# Patient Record
Sex: MALE | Race: WHITE | Employment: STUDENT | URBAN - METROPOLITAN AREA
[De-identification: names, ages, dates, MRNs, and addresses within clinical notes are randomized per-mention and may not be internally consistent; named-entity substitution may affect disease eponyms.]

---
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
## 2020-07-21 ENCOUNTER — APPOINTMENT (OUTPATIENT)
Dept: LAB | Facility: HOSPITAL | Age: 20
End: 2020-07-21
Attending: INTERNAL MEDICINE
Payer: COMMERCIAL

## 2020-07-21 ENCOUNTER — OFFICE VISIT (OUTPATIENT)
Dept: INTERNAL MEDICINE CLINIC | Facility: CLINIC | Age: 20
End: 2020-07-21
Payer: COMMERCIAL

## 2020-07-21 VITALS
SYSTOLIC BLOOD PRESSURE: 100 MMHG | WEIGHT: 129.69 LBS | RESPIRATION RATE: 16 BRPM | HEART RATE: 75 BPM | BODY MASS INDEX: 18.16 KG/M2 | HEIGHT: 71 IN | DIASTOLIC BLOOD PRESSURE: 71 MMHG

## 2020-07-21 DIAGNOSIS — Z00.00 ADULT GENERAL MEDICAL EXAM: Primary | ICD-10-CM

## 2020-07-21 DIAGNOSIS — Z00.00 ADULT GENERAL MEDICAL EXAM: ICD-10-CM

## 2020-07-21 LAB
ALBUMIN SERPL-MCNC: 4.4 G/DL (ref 3.4–5)
ALBUMIN/GLOB SERPL: 1.3 {RATIO} (ref 1–2)
ALP LIVER SERPL-CCNC: 87 U/L (ref 45–117)
ALT SERPL-CCNC: 18 U/L (ref 16–61)
ANION GAP SERPL CALC-SCNC: 3 MMOL/L (ref 0–18)
AST SERPL-CCNC: 11 U/L (ref 15–37)
BILIRUB SERPL-MCNC: 0.4 MG/DL (ref 0.1–2)
BUN BLD-MCNC: 14 MG/DL (ref 7–18)
BUN/CREAT SERPL: 15.4 (ref 10–20)
CALCIUM BLD-MCNC: 9.1 MG/DL (ref 8.5–10.1)
CHLORIDE SERPL-SCNC: 106 MMOL/L (ref 98–112)
CHOLEST SMN-MCNC: 110 MG/DL (ref ?–200)
CO2 SERPL-SCNC: 31 MMOL/L (ref 21–32)
CREAT BLD-MCNC: 0.91 MG/DL (ref 0.7–1.3)
DEPRECATED RDW RBC AUTO: 39.2 FL (ref 35.1–46.3)
ERYTHROCYTE [DISTWIDTH] IN BLOOD BY AUTOMATED COUNT: 12.6 % (ref 11–15)
GLOBULIN PLAS-MCNC: 3.5 G/DL (ref 2.8–4.4)
GLUCOSE BLD-MCNC: 76 MG/DL (ref 70–99)
HCT VFR BLD AUTO: 43.9 % (ref 39–53)
HDLC SERPL-MCNC: 50 MG/DL (ref 40–59)
HGB BLD-MCNC: 15.3 G/DL (ref 13–17.5)
LDLC SERPL CALC-MCNC: 46 MG/DL (ref ?–100)
M PROTEIN MFR SERPL ELPH: 7.9 G/DL (ref 6.4–8.2)
MCH RBC QN AUTO: 29.9 PG (ref 26–34)
MCHC RBC AUTO-ENTMCNC: 34.9 G/DL (ref 31–37)
MCV RBC AUTO: 85.7 FL (ref 80–100)
NONHDLC SERPL-MCNC: 60 MG/DL (ref ?–130)
OSMOLALITY SERPL CALC.SUM OF ELEC: 289 MOSM/KG (ref 275–295)
PATIENT FASTING Y/N/NP: YES
PATIENT FASTING Y/N/NP: YES
PLATELET # BLD AUTO: 230 10(3)UL (ref 150–450)
POTASSIUM SERPL-SCNC: 3.7 MMOL/L (ref 3.5–5.1)
RBC # BLD AUTO: 5.12 X10(6)UL (ref 4.3–5.7)
SODIUM SERPL-SCNC: 140 MMOL/L (ref 136–145)
TRIGL SERPL-MCNC: 71 MG/DL (ref 30–149)
TSI SER-ACNC: 3.15 MIU/ML (ref 0.36–3.74)
VLDLC SERPL CALC-MCNC: 14 MG/DL (ref 0–30)
WBC # BLD AUTO: 7.6 X10(3) UL (ref 4–11)

## 2020-07-21 PROCEDURE — 80061 LIPID PANEL: CPT

## 2020-07-21 PROCEDURE — 85027 COMPLETE CBC AUTOMATED: CPT

## 2020-07-21 PROCEDURE — 3008F BODY MASS INDEX DOCD: CPT | Performed by: INTERNAL MEDICINE

## 2020-07-21 PROCEDURE — 84443 ASSAY THYROID STIM HORMONE: CPT

## 2020-07-21 PROCEDURE — 80053 COMPREHEN METABOLIC PANEL: CPT

## 2020-07-21 PROCEDURE — 3078F DIAST BP <80 MM HG: CPT | Performed by: INTERNAL MEDICINE

## 2020-07-21 PROCEDURE — 99395 PREV VISIT EST AGE 18-39: CPT | Performed by: INTERNAL MEDICINE

## 2020-07-21 PROCEDURE — 3074F SYST BP LT 130 MM HG: CPT | Performed by: INTERNAL MEDICINE

## 2020-07-21 PROCEDURE — 36415 COLL VENOUS BLD VENIPUNCTURE: CPT

## 2020-07-21 NOTE — PROGRESS NOTES
Jeffry Chaudhary is a 23year old male. Patient presents with:  Physical    HPI:   70-year-old gentleman with no significant medical history here for physical.  He has no complaints.       Past medical history none  Past surgical history none    He is not [place] Psychiatric/Behavioral: Negative for hallucinations, behavioral problems, confusion and agitation.      Wt Readings from Last 5 Encounters:  07/21/20 : 129 lb 11.2 oz (58.8 kg) (12 %, Z= -1.20)*  02/25/19 : 134 lb (60.8 kg) (24 %, Z= -0.71)*  02/14/19 : 1 DIFFERENTIAL; Future  - COMP METABOLIC PANEL (14); Future  - TSH W REFLEX TO FREE T4; Future  - LIPID PANEL; Future    Lump  neck has been stable. I have reviewed the previous ultrasound report.   No need for further work-up unless it is getting bigger

## 2021-06-07 ENCOUNTER — OFFICE VISIT (OUTPATIENT)
Dept: INTERNAL MEDICINE CLINIC | Facility: CLINIC | Age: 21
End: 2021-06-07
Payer: COMMERCIAL

## 2021-06-07 VITALS
WEIGHT: 145.13 LBS | DIASTOLIC BLOOD PRESSURE: 73 MMHG | BODY MASS INDEX: 20.32 KG/M2 | SYSTOLIC BLOOD PRESSURE: 110 MMHG | HEIGHT: 71 IN | HEART RATE: 69 BPM

## 2021-06-07 DIAGNOSIS — H00.012 HORDEOLUM EXTERNUM OF RIGHT LOWER EYELID: Primary | ICD-10-CM

## 2021-06-07 PROCEDURE — 3008F BODY MASS INDEX DOCD: CPT | Performed by: INTERNAL MEDICINE

## 2021-06-07 PROCEDURE — 3078F DIAST BP <80 MM HG: CPT | Performed by: INTERNAL MEDICINE

## 2021-06-07 PROCEDURE — 99213 OFFICE O/P EST LOW 20 MIN: CPT | Performed by: INTERNAL MEDICINE

## 2021-06-07 PROCEDURE — 3074F SYST BP LT 130 MM HG: CPT | Performed by: INTERNAL MEDICINE

## 2021-06-07 RX ORDER — TOBRAMYCIN 3 MG/ML
2 SOLUTION/ DROPS OPHTHALMIC EVERY 6 HOURS
Qty: 1 EACH | Refills: 1 | Status: SHIPPED | OUTPATIENT
Start: 2021-06-07

## 2021-06-07 NOTE — PROGRESS NOTES
Aguilar Sims is a 21year old male. Patient presents with:  Eye Problem: stye on right eye    HPI:   For approximately 1 month he has had a persistent stye on the lateral aspect of his right lower eyelid.   He has been applying warm compresses once daily

## 2021-06-07 NOTE — PATIENT INSTRUCTIONS
Please apply warm compress to your right lower eyelid 3 times daily. Use tobramycin eyedrops 2 drops right eye 4 times daily. Call if no better.

## 2021-07-15 ENCOUNTER — OFFICE VISIT (OUTPATIENT)
Dept: INTERNAL MEDICINE CLINIC | Facility: CLINIC | Age: 21
End: 2021-07-15
Payer: COMMERCIAL

## 2021-07-15 VITALS
HEART RATE: 106 BPM | BODY MASS INDEX: 20.02 KG/M2 | SYSTOLIC BLOOD PRESSURE: 106 MMHG | HEIGHT: 71 IN | RESPIRATION RATE: 14 BRPM | WEIGHT: 143 LBS | DIASTOLIC BLOOD PRESSURE: 70 MMHG | OXYGEN SATURATION: 97 %

## 2021-07-15 DIAGNOSIS — D23.39 DERMOID CYST OF FOREHEAD: Primary | ICD-10-CM

## 2021-07-15 PROCEDURE — 3078F DIAST BP <80 MM HG: CPT | Performed by: INTERNAL MEDICINE

## 2021-07-15 PROCEDURE — 3074F SYST BP LT 130 MM HG: CPT | Performed by: INTERNAL MEDICINE

## 2021-07-15 PROCEDURE — 3008F BODY MASS INDEX DOCD: CPT | Performed by: INTERNAL MEDICINE

## 2021-07-15 PROCEDURE — 99213 OFFICE O/P EST LOW 20 MIN: CPT | Performed by: INTERNAL MEDICINE

## 2021-07-15 NOTE — PROGRESS NOTES
Ade Perez is a 21year old male. Patient presents with:  Lump: Right side Forehead lump    HPI:   26-year-old gentleman with no significant medical history here for evaluation of cyst in the forehead.   He reports that he noticed it couple of months adarsh Pulse 106   Resp 14   Ht 5' 11\" (1.803 m)   Wt 143 lb (64.9 kg)   SpO2 97%   BMI 19.94 kg/m²     Physical Exam  Constitutional:       Appearance: Normal appearance. HENT:      Head: Normocephalic.    Eyes:      Conjunctiva/sclera: Conjunctivae normal.

## 2021-07-19 ENCOUNTER — OFFICE VISIT (OUTPATIENT)
Dept: SURGERY | Facility: CLINIC | Age: 21
End: 2021-07-19
Payer: COMMERCIAL

## 2021-07-19 VITALS
HEIGHT: 71 IN | BODY MASS INDEX: 20.02 KG/M2 | DIASTOLIC BLOOD PRESSURE: 40 MMHG | WEIGHT: 143 LBS | HEART RATE: 76 BPM | SYSTOLIC BLOOD PRESSURE: 88 MMHG

## 2021-07-19 DIAGNOSIS — L08.9 INFECTED SEBACEOUS CYST: Primary | ICD-10-CM

## 2021-07-19 DIAGNOSIS — L72.3 INFECTED SEBACEOUS CYST: Primary | ICD-10-CM

## 2021-07-19 PROCEDURE — 3078F DIAST BP <80 MM HG: CPT | Performed by: SURGERY

## 2021-07-19 PROCEDURE — 3008F BODY MASS INDEX DOCD: CPT | Performed by: SURGERY

## 2021-07-19 PROCEDURE — 3074F SYST BP LT 130 MM HG: CPT | Performed by: SURGERY

## 2021-07-19 PROCEDURE — 99203 OFFICE O/P NEW LOW 30 MIN: CPT | Performed by: SURGERY

## 2021-07-19 PROCEDURE — 11442 EXC FACE-MM B9+MARG 1.1-2 CM: CPT | Performed by: SURGERY

## 2021-07-19 RX ORDER — CEPHALEXIN 500 MG/1
500 CAPSULE ORAL 2 TIMES DAILY
Qty: 14 CAPSULE | Refills: 0 | Status: SHIPPED | OUTPATIENT
Start: 2021-07-19 | End: 2021-07-26

## 2021-07-19 NOTE — H&P
History and Physical      HPI       HPI  Emma Haley is a 21year old male who presents with an inflamed right sided forehead inclusion cyst.  Is been present for some time and he desires excision. Risk benefits options explained.   Does have some scatt gallups, or rubs  Abdomen: soft non-tender non-distended no organomegaly noted no masses  Extremities: no edema, cyanosis, or clubbing  Neurological: exam appropriate for age reflexes and motor skills appropriate for age      ASSESSMENT/PLAN   Assessment

## 2021-07-19 NOTE — PATIENT INSTRUCTIONS
Ice pack if needed. Start Keflex 500 mg twice daily. Keep suture covered with a Band-Aid. Okay to shower in 24 hours. Tylenol or ibuprofen for pain.   Follow-up with me 1 week

## 2021-07-19 NOTE — PROCEDURES
Surgery procedure note    Timeout performed and consent signed. Right forehead prepped and draped in the usual sterile fashion. Small incision is made directly over the cyst and a 2 cm inclusion cyst is removed, and 6 a scant amount of pus.   Hemostasis m

## 2021-07-26 ENCOUNTER — OFFICE VISIT (OUTPATIENT)
Dept: SURGERY | Facility: CLINIC | Age: 21
End: 2021-07-26
Payer: COMMERCIAL

## 2021-07-26 VITALS — WEIGHT: 143 LBS | HEIGHT: 71 IN | BODY MASS INDEX: 20.02 KG/M2

## 2021-07-26 DIAGNOSIS — L08.9 INFECTED SEBACEOUS CYST: Primary | ICD-10-CM

## 2021-07-26 DIAGNOSIS — L72.3 INFECTED SEBACEOUS CYST: Primary | ICD-10-CM

## 2021-07-26 PROCEDURE — 99024 POSTOP FOLLOW-UP VISIT: CPT | Performed by: SURGERY

## 2021-07-26 NOTE — PROGRESS NOTES
Postoperative Patient Follow-up      7/26/2021    HENRIETTA Mora Zheng is a 21year old male postop after excision of forehead cyst.  Doing well. Exam  Incision is clean dry intact. Sutures removed.       Assessment/Plan  Assessment   Infected seba

## 2021-08-09 ENCOUNTER — OFFICE VISIT (OUTPATIENT)
Dept: SURGERY | Facility: CLINIC | Age: 21
End: 2021-08-09
Payer: COMMERCIAL

## 2021-08-09 VITALS — WEIGHT: 143 LBS | BODY MASS INDEX: 20.02 KG/M2 | HEIGHT: 71 IN

## 2021-08-09 DIAGNOSIS — L72.3 INFECTED SEBACEOUS CYST: Primary | ICD-10-CM

## 2021-08-09 DIAGNOSIS — L08.9 INFECTED SEBACEOUS CYST: Primary | ICD-10-CM

## 2021-08-09 PROCEDURE — 3008F BODY MASS INDEX DOCD: CPT | Performed by: SURGERY

## 2021-08-09 PROCEDURE — 99213 OFFICE O/P EST LOW 20 MIN: CPT | Performed by: SURGERY

## 2021-08-09 NOTE — PROGRESS NOTES
Surgery progress note    Patient here for evaluation of forehead cyst.  This was lanced drain in place time antibiotics 2 weeks ago.     Noticed some induration in that area and is unsure whether it scar tissue or recurrence of the cyst.    Exam-there is 8

## 2022-12-15 ENCOUNTER — OFFICE VISIT (OUTPATIENT)
Dept: INTERNAL MEDICINE CLINIC | Facility: CLINIC | Age: 22
End: 2022-12-15
Payer: COMMERCIAL

## 2022-12-15 VITALS
HEART RATE: 85 BPM | SYSTOLIC BLOOD PRESSURE: 115 MMHG | HEIGHT: 72 IN | WEIGHT: 134 LBS | RESPIRATION RATE: 16 BRPM | DIASTOLIC BLOOD PRESSURE: 80 MMHG | BODY MASS INDEX: 18.15 KG/M2

## 2022-12-15 DIAGNOSIS — Z23 FLU VACCINE NEED: ICD-10-CM

## 2022-12-15 DIAGNOSIS — Z00.00 ANNUAL PHYSICAL EXAM: Primary | ICD-10-CM

## 2022-12-15 PROCEDURE — 99395 PREV VISIT EST AGE 18-39: CPT | Performed by: NURSE PRACTITIONER

## 2022-12-15 PROCEDURE — 3074F SYST BP LT 130 MM HG: CPT | Performed by: NURSE PRACTITIONER

## 2022-12-15 PROCEDURE — 3079F DIAST BP 80-89 MM HG: CPT | Performed by: NURSE PRACTITIONER

## 2022-12-15 PROCEDURE — 3008F BODY MASS INDEX DOCD: CPT | Performed by: NURSE PRACTITIONER

## 2022-12-15 PROCEDURE — 90471 IMMUNIZATION ADMIN: CPT | Performed by: NURSE PRACTITIONER

## 2022-12-15 PROCEDURE — 90686 IIV4 VACC NO PRSV 0.5 ML IM: CPT | Performed by: NURSE PRACTITIONER

## 2022-12-28 ENCOUNTER — LAB ENCOUNTER (OUTPATIENT)
Dept: LAB | Age: 22
End: 2022-12-28
Attending: NURSE PRACTITIONER
Payer: COMMERCIAL

## 2022-12-28 DIAGNOSIS — Z00.00 ANNUAL PHYSICAL EXAM: ICD-10-CM

## 2022-12-28 LAB
ALBUMIN SERPL-MCNC: 4 G/DL (ref 3.4–5)
ALBUMIN/GLOB SERPL: 1.1 {RATIO} (ref 1–2)
ALP LIVER SERPL-CCNC: 98 U/L
ALT SERPL-CCNC: 21 U/L
ANION GAP SERPL CALC-SCNC: 6 MMOL/L (ref 0–18)
AST SERPL-CCNC: 18 U/L (ref 15–37)
BASOPHILS # BLD AUTO: 0.06 X10(3) UL (ref 0–0.2)
BASOPHILS NFR BLD AUTO: 0.9 %
BILIRUB SERPL-MCNC: 0.5 MG/DL (ref 0.1–2)
BUN BLD-MCNC: 16 MG/DL (ref 7–18)
BUN/CREAT SERPL: 17.2 (ref 10–20)
CALCIUM BLD-MCNC: 9 MG/DL (ref 8.5–10.1)
CHLORIDE SERPL-SCNC: 104 MMOL/L (ref 98–112)
CHOLEST SERPL-MCNC: 107 MG/DL (ref ?–200)
CO2 SERPL-SCNC: 31 MMOL/L (ref 21–32)
CREAT BLD-MCNC: 0.93 MG/DL
DEPRECATED RDW RBC AUTO: 40.8 FL (ref 35.1–46.3)
EOSINOPHIL # BLD AUTO: 0.39 X10(3) UL (ref 0–0.7)
EOSINOPHIL NFR BLD AUTO: 5.7 %
ERYTHROCYTE [DISTWIDTH] IN BLOOD BY AUTOMATED COUNT: 12.7 % (ref 11–15)
FASTING PATIENT LIPID ANSWER: YES
FASTING STATUS PATIENT QL REPORTED: YES
GFR SERPLBLD BASED ON 1.73 SQ M-ARVRAT: 119 ML/MIN/1.73M2 (ref 60–?)
GLOBULIN PLAS-MCNC: 3.8 G/DL (ref 2.8–4.4)
GLUCOSE BLD-MCNC: 93 MG/DL (ref 70–99)
HCT VFR BLD AUTO: 46.1 %
HDLC SERPL-MCNC: 55 MG/DL (ref 40–59)
HGB BLD-MCNC: 15.5 G/DL
IMM GRANULOCYTES # BLD AUTO: 0.01 X10(3) UL (ref 0–1)
IMM GRANULOCYTES NFR BLD: 0.1 %
LDLC SERPL CALC-MCNC: 36 MG/DL (ref ?–100)
LYMPHOCYTES # BLD AUTO: 3.64 X10(3) UL (ref 1–4)
LYMPHOCYTES NFR BLD AUTO: 53.5 %
MCH RBC QN AUTO: 29.4 PG (ref 26–34)
MCHC RBC AUTO-ENTMCNC: 33.6 G/DL (ref 31–37)
MCV RBC AUTO: 87.5 FL
MONOCYTES # BLD AUTO: 0.58 X10(3) UL (ref 0.1–1)
MONOCYTES NFR BLD AUTO: 8.5 %
NEUTROPHILS # BLD AUTO: 2.12 X10 (3) UL (ref 1.5–7.7)
NEUTROPHILS # BLD AUTO: 2.12 X10(3) UL (ref 1.5–7.7)
NEUTROPHILS NFR BLD AUTO: 31.3 %
NONHDLC SERPL-MCNC: 52 MG/DL (ref ?–130)
OSMOLALITY SERPL CALC.SUM OF ELEC: 293 MOSM/KG (ref 275–295)
PLATELET # BLD AUTO: 249 10(3)UL (ref 150–450)
POTASSIUM SERPL-SCNC: 3.9 MMOL/L (ref 3.5–5.1)
PROT SERPL-MCNC: 7.8 G/DL (ref 6.4–8.2)
RBC # BLD AUTO: 5.27 X10(6)UL
SODIUM SERPL-SCNC: 141 MMOL/L (ref 136–145)
T4 FREE SERPL-MCNC: 0.9 NG/DL (ref 0.8–1.7)
TRIGL SERPL-MCNC: 80 MG/DL (ref 30–149)
TSI SER-ACNC: 4.2 MIU/ML (ref 0.36–3.74)
VLDLC SERPL CALC-MCNC: 11 MG/DL (ref 0–30)
WBC # BLD AUTO: 6.8 X10(3) UL (ref 4–11)

## 2022-12-28 PROCEDURE — 36415 COLL VENOUS BLD VENIPUNCTURE: CPT

## 2022-12-28 PROCEDURE — 85025 COMPLETE CBC W/AUTO DIFF WBC: CPT

## 2022-12-28 PROCEDURE — 80053 COMPREHEN METABOLIC PANEL: CPT

## 2022-12-28 PROCEDURE — 84439 ASSAY OF FREE THYROXINE: CPT

## 2022-12-28 PROCEDURE — 80061 LIPID PANEL: CPT

## 2022-12-28 PROCEDURE — 84443 ASSAY THYROID STIM HORMONE: CPT

## 2023-03-18 ENCOUNTER — LAB ENCOUNTER (OUTPATIENT)
Dept: LAB | Age: 23
End: 2023-03-18
Attending: NURSE PRACTITIONER
Payer: COMMERCIAL

## 2023-03-18 DIAGNOSIS — E03.8 SUBCLINICAL HYPOTHYROIDISM: ICD-10-CM

## 2023-03-18 LAB — TSI SER-ACNC: 2.71 MIU/ML (ref 0.36–3.74)

## 2023-03-18 PROCEDURE — 36415 COLL VENOUS BLD VENIPUNCTURE: CPT

## 2023-03-18 PROCEDURE — 84443 ASSAY THYROID STIM HORMONE: CPT

## 2023-04-17 ENCOUNTER — HOSPITAL ENCOUNTER (OUTPATIENT)
Age: 23
Discharge: HOME | End: 2023-04-17
Payer: COMMERCIAL

## 2023-04-17 VITALS
RESPIRATION RATE: 18 BRPM | OXYGEN SATURATION: 98 % | TEMPERATURE: 98 F | SYSTOLIC BLOOD PRESSURE: 128 MMHG | DIASTOLIC BLOOD PRESSURE: 74 MMHG | HEART RATE: 74 BPM

## 2023-04-17 DIAGNOSIS — R51.9 ACUTE NONINTRACTABLE HEADACHE, UNSPECIFIED HEADACHE TYPE: ICD-10-CM

## 2023-04-17 DIAGNOSIS — J02.9 VIRAL PHARYNGITIS: Primary | ICD-10-CM

## 2023-04-17 LAB — S PYO AG THROAT QL: NEGATIVE

## 2023-04-17 PROCEDURE — 87880 STREP A ASSAY W/OPTIC: CPT | Performed by: NURSE PRACTITIONER

## 2023-04-17 PROCEDURE — 99213 OFFICE O/P EST LOW 20 MIN: CPT | Performed by: NURSE PRACTITIONER

## 2023-04-17 NOTE — DISCHARGE INSTRUCTIONS
Take over-the-counter ibuprofen or Tylenol for pain. Gargle with salt water 2-3 times per day and spit it out. Drink plenty of fluids warm tea with honey you may try over-the-counter Chloraseptic spray. Follow-up with your primary care provider if symptoms persist or worsen.

## 2023-05-22 ENCOUNTER — OFFICE VISIT (OUTPATIENT)
Dept: INTERNAL MEDICINE CLINIC | Facility: CLINIC | Age: 23
End: 2023-05-22

## 2023-05-22 VITALS
DIASTOLIC BLOOD PRESSURE: 76 MMHG | WEIGHT: 136 LBS | HEART RATE: 89 BPM | RESPIRATION RATE: 16 BRPM | SYSTOLIC BLOOD PRESSURE: 120 MMHG | HEIGHT: 72 IN | BODY MASS INDEX: 18.42 KG/M2

## 2023-05-22 DIAGNOSIS — Z02.89 ENCOUNTER FOR PHYSICAL EXAMINATION RELATED TO EMPLOYMENT: Primary | ICD-10-CM

## 2023-07-20 ENCOUNTER — OFFICE VISIT (OUTPATIENT)
Dept: INTERNAL MEDICINE CLINIC | Facility: CLINIC | Age: 23
End: 2023-07-20

## 2023-07-20 VITALS
OXYGEN SATURATION: 98 % | HEART RATE: 76 BPM | DIASTOLIC BLOOD PRESSURE: 75 MMHG | WEIGHT: 146 LBS | RESPIRATION RATE: 22 BRPM | BODY MASS INDEX: 19.77 KG/M2 | HEIGHT: 72 IN | SYSTOLIC BLOOD PRESSURE: 110 MMHG

## 2023-07-20 DIAGNOSIS — H00.011 HORDEOLUM EXTERNUM OF RIGHT UPPER EYELID: Primary | ICD-10-CM

## 2023-07-20 PROCEDURE — 99213 OFFICE O/P EST LOW 20 MIN: CPT | Performed by: NURSE PRACTITIONER

## 2023-07-20 PROCEDURE — 3074F SYST BP LT 130 MM HG: CPT | Performed by: NURSE PRACTITIONER

## 2023-07-20 PROCEDURE — 3078F DIAST BP <80 MM HG: CPT | Performed by: NURSE PRACTITIONER

## 2023-07-20 PROCEDURE — 3008F BODY MASS INDEX DOCD: CPT | Performed by: NURSE PRACTITIONER

## 2023-07-20 RX ORDER — TOBRAMYCIN 3 MG/ML
1 SOLUTION/ DROPS OPHTHALMIC EVERY 4 HOURS
Qty: 5 ML | Refills: 0 | Status: SHIPPED | OUTPATIENT
Start: 2023-07-20

## (undated) NOTE — LETTER
AUTHORIZATION FOR SURGICAL OPERATION OR OTHER PROCEDURE    1.  I hereby authorize Dr. Enzo Ramirez, and [facility] assigned to my case to perform the following operation and/or procedure at the [facility]:      acupuncture  _____________ Time:  ________ A. M.  P.M.        Patient Name:  ______________________________________________________  (please print)      Patient signature:  ___________________________________________________             Relationship to Patient: